# Patient Record
Sex: FEMALE | Race: BLACK OR AFRICAN AMERICAN | NOT HISPANIC OR LATINO | Employment: FULL TIME | URBAN - METROPOLITAN AREA
[De-identification: names, ages, dates, MRNs, and addresses within clinical notes are randomized per-mention and may not be internally consistent; named-entity substitution may affect disease eponyms.]

---

## 2023-04-15 LAB
CHLAMYDIA TRACH., AMPLIFIED: NEGATIVE
ERYTHROCYTE DISTRIBUTION WIDTH (RATIO) BY AUTOMATED COUNT: 12.4 % (ref 11.5–14.5)
ERYTHROCYTE MEAN CORPUSCULAR HEMOGLOBIN CONCENTRATION (G/DL) BY AUTOMATED: 32.1 G/DL (ref 32–36)
ERYTHROCYTE MEAN CORPUSCULAR VOLUME (FL) BY AUTOMATED COUNT: 91 FL (ref 80–100)
ERYTHROCYTES (10*6/UL) IN BLOOD BY AUTOMATED COUNT: 3.88 X10E12/L (ref 4–5.2)
GLUCOSE, 1 HR SCREEN, PREG: 104 MG/DL
HEMATOCRIT (%) IN BLOOD BY AUTOMATED COUNT: 35.2 % (ref 36–46)
HEMOGLOBIN (G/DL) IN BLOOD: 11.3 G/DL (ref 12–16)
HEPATITIS C VIRUS AB PRESENCE IN SERUM: NONREACTIVE
LEUKOCYTES (10*3/UL) IN BLOOD BY AUTOMATED COUNT: 6.7 X10E9/L (ref 4.4–11.3)
N. GONORRHEA, AMPLIFIED: NEGATIVE
NRBC (PER 100 WBCS) BY AUTOMATED COUNT: 0 /100 WBC (ref 0–0)
PLATELETS (10*3/UL) IN BLOOD AUTOMATED COUNT: 306 X10E9/L (ref 150–450)
REFLEX ADDED, ANEMIA PANEL: ABNORMAL
SYPHILIS TOTAL AB: NONREACTIVE
TRICHOMONAS VAGINALIS: NEGATIVE

## 2023-04-22 ENCOUNTER — HOSPITAL ENCOUNTER (OUTPATIENT)
Dept: DATA CONVERSION | Facility: HOSPITAL | Age: 23
End: 2023-04-22
Attending: OBSTETRICS & GYNECOLOGY

## 2023-04-22 DIAGNOSIS — O21.9 VOMITING OF PREGNANCY, UNSPECIFIED (HHS-HCC): ICD-10-CM

## 2023-04-22 DIAGNOSIS — R42 DIZZINESS AND GIDDINESS: ICD-10-CM

## 2023-04-22 DIAGNOSIS — O21.8 OTHER VOMITING COMPLICATING PREGNANCY (HHS-HCC): ICD-10-CM

## 2023-04-22 DIAGNOSIS — Z3A.31 31 WEEKS GESTATION OF PREGNANCY (HHS-HCC): ICD-10-CM

## 2023-04-22 DIAGNOSIS — O26.893 OTHER SPECIFIED PREGNANCY RELATED CONDITIONS, THIRD TRIMESTER (HHS-HCC): ICD-10-CM

## 2023-04-22 LAB
ALANINE AMINOTRANSFERASE (SGPT) (U/L) IN SER/PLAS: 12 U/L (ref 7–45)
ALBUMIN (G/DL) IN SER/PLAS: 3.8 G/DL (ref 3.4–5)
ALKALINE PHOSPHATASE (U/L) IN SER/PLAS: 136 U/L (ref 33–110)
ANION GAP IN SER/PLAS: 15 MMOL/L (ref 10–20)
ASPARTATE AMINOTRANSFERASE (SGOT) (U/L) IN SER/PLAS: 17 U/L (ref 9–39)
BILIRUBIN TOTAL (MG/DL) IN SER/PLAS: 0.4 MG/DL (ref 0–1.2)
CALCIUM (MG/DL) IN SER/PLAS: 9.6 MG/DL (ref 8.6–10.6)
CARBON DIOXIDE, TOTAL (MMOL/L) IN SER/PLAS: 20 MMOL/L (ref 21–32)
CHLORIDE (MMOL/L) IN SER/PLAS: 102 MMOL/L (ref 98–107)
CREATININE (MG/DL) IN SER/PLAS: 0.49 MG/DL (ref 0.5–1.05)
GFR FEMALE: >90 ML/MIN/1.73M2
GLUCOSE (MG/DL) IN SER/PLAS: 93 MG/DL (ref 74–99)
POTASSIUM (MMOL/L) IN SER/PLAS: 3.7 MMOL/L (ref 3.5–5.3)
PROTEIN TOTAL: 7.1 G/DL (ref 6.4–8.2)
SODIUM (MMOL/L) IN SER/PLAS: 133 MMOL/L (ref 136–145)
UREA NITROGEN (MG/DL) IN SER/PLAS: 7 MG/DL (ref 6–23)

## 2023-04-23 ENCOUNTER — HOSPITAL ENCOUNTER (OUTPATIENT)
Dept: DATA CONVERSION | Facility: HOSPITAL | Age: 23
End: 2023-04-23
Attending: OBSTETRICS & GYNECOLOGY

## 2023-04-23 DIAGNOSIS — Z3A.31 31 WEEKS GESTATION OF PREGNANCY (HHS-HCC): ICD-10-CM

## 2023-04-23 DIAGNOSIS — O21.9 VOMITING OF PREGNANCY, UNSPECIFIED (HHS-HCC): ICD-10-CM

## 2023-04-23 DIAGNOSIS — O99.513 DISEASES OF THE RESPIRATORY SYSTEM COMPLICATING PREGNANCY, THIRD TRIMESTER (HHS-HCC): ICD-10-CM

## 2023-04-23 DIAGNOSIS — R42 DIZZINESS AND GIDDINESS: ICD-10-CM

## 2023-04-23 DIAGNOSIS — J45.909 UNSPECIFIED ASTHMA, UNCOMPLICATED (HHS-HCC): ICD-10-CM

## 2023-04-23 DIAGNOSIS — O26.893 OTHER SPECIFIED PREGNANCY RELATED CONDITIONS, THIRD TRIMESTER (HHS-HCC): ICD-10-CM

## 2023-05-27 LAB — GROUP B STREP SCREEN: NORMAL

## 2023-05-29 ENCOUNTER — HOSPITAL ENCOUNTER (OUTPATIENT)
Dept: DATA CONVERSION | Facility: HOSPITAL | Age: 23
End: 2023-05-29
Attending: OBSTETRICS & GYNECOLOGY

## 2023-05-29 DIAGNOSIS — M79.89 OTHER SPECIFIED SOFT TISSUE DISORDERS: ICD-10-CM

## 2023-05-29 DIAGNOSIS — O09.13 SUPERVISION OF PREGNANCY WITH HISTORY OF ECTOPIC PREGNANCY, THIRD TRIMESTER (HHS-HCC): ICD-10-CM

## 2023-05-29 DIAGNOSIS — Z34.80 ENCOUNTER FOR SUPERVISION OF OTHER NORMAL PREGNANCY, UNSPECIFIED TRIMESTER (HHS-HCC): ICD-10-CM

## 2023-05-29 DIAGNOSIS — O99.213 OBESITY COMPLICATING PREGNANCY, THIRD TRIMESTER (HHS-HCC): ICD-10-CM

## 2023-05-29 DIAGNOSIS — J45.909 UNSPECIFIED ASTHMA, UNCOMPLICATED (HHS-HCC): ICD-10-CM

## 2023-05-29 DIAGNOSIS — O99.513 DISEASES OF THE RESPIRATORY SYSTEM COMPLICATING PREGNANCY, THIRD TRIMESTER (HHS-HCC): ICD-10-CM

## 2023-05-29 DIAGNOSIS — E66.9 OBESITY, UNSPECIFIED: ICD-10-CM

## 2023-05-29 DIAGNOSIS — O26.893 OTHER SPECIFIED PREGNANCY RELATED CONDITIONS, THIRD TRIMESTER (HHS-HCC): ICD-10-CM

## 2023-05-29 DIAGNOSIS — R03.0 ELEVATED BLOOD-PRESSURE READING, WITHOUT DIAGNOSIS OF HYPERTENSION: ICD-10-CM

## 2023-05-29 DIAGNOSIS — Z3A.37 37 WEEKS GESTATION OF PREGNANCY (HHS-HCC): ICD-10-CM

## 2023-05-29 DIAGNOSIS — O99.891 OTHER SPECIFIED DISEASES AND CONDITIONS COMPLICATING PREGNANCY (HHS-HCC): ICD-10-CM

## 2023-09-07 VITALS
SYSTOLIC BLOOD PRESSURE: 102 MMHG | HEIGHT: 59 IN | WEIGHT: 158.73 LBS | BODY MASS INDEX: 32 KG/M2 | OXYGEN SATURATION: 99 % | BODY MASS INDEX: 37.47 KG/M2 | DIASTOLIC BLOOD PRESSURE: 69 MMHG | SYSTOLIC BLOOD PRESSURE: 122 MMHG | RESPIRATION RATE: 17 BRPM | HEIGHT: 59 IN | WEIGHT: 185.85 LBS | RESPIRATION RATE: 20 BRPM | DIASTOLIC BLOOD PRESSURE: 75 MMHG | TEMPERATURE: 98.6 F | OXYGEN SATURATION: 96 % | HEART RATE: 91 BPM | HEART RATE: 84 BPM

## 2023-09-07 VITALS
HEART RATE: 96 BPM | SYSTOLIC BLOOD PRESSURE: 127 MMHG | RESPIRATION RATE: 16 BRPM | BODY MASS INDEX: 36.4 KG/M2 | WEIGHT: 180.56 LBS | OXYGEN SATURATION: 98 % | HEIGHT: 59 IN | TEMPERATURE: 98.1 F | DIASTOLIC BLOOD PRESSURE: 84 MMHG

## 2023-09-14 NOTE — PROGRESS NOTES
Current Stage:   Stage: Triage     Subjective Data:   Antepartum:  Vaginal Bleeding: No   Contractions/Abdominal Pain: No   Discharge/Loss of Fluid: No   Fetal Movement: Good   Fevers/Chills: No   Preeclampsia Symptoms: No   Antepartum:    22 y/o  @ 31.5 wga by LMP presents with complaints of nausea, vomiting & dizziness. Nausea & vomiting new. Dizziness & lightheadedness since 3rd trimester.  Most recent episodes since Monday were a combination of nausea, vomiting & dizziness on Monday. It resolved after a few hours on Monday, but recurred today. She has vomited a total of 2 times. Concerned about the dizziness because she had the symptoms  without even getting out of bed. Symptoms impacted her ability to function @ work, so she left early. She was awakened from her sleep with dizziness, so she decided to come for evaluation. Per visit note on 23, pt has c/o lightheadedness & dizziness  3-4 times a week. Has experienced a lot of work stress, and had given a 2 week notice. Pt denied fever, h/a, loss of smell, body ache, rhinorrhea or any other symptoms. Denied sick contacts. Endorses good FM, no ctxs, VB, LOF.     Prenatal notables:  Transfer of care from [facility] @ 29 weeks  Elevated BP reading @ OB FU 23 visit, rpt normal         Objective Information:    Objective Information:      T   P  R  BP   MAP  SpO2   Value  36.4  81  18  116/73   90  99%  Date/Time  2:40  2:40  2:40  2:40   2:40  2:40  Range  (36.4C - 36.4C )  (81 - 81 )  (18 - 18 )  (116 - 116 )/ (73 - 73 )  (90 - 90 )  (99% - 99% )      Pain reported at  2:40: 0 = None      Physical Exam:   Constitutional: Alert, oriented, NAD   Obstetric: , moderate variability, + accels  x1, no decels  TOCO irregular   VE Deferred, no labor complaints    Discussed with pt plan for Dr. Mendez to perform BPP while waiting for lab work. Overall EFM reassuring.   Eyes: Clear, no redness or drainage   ENMT: Normal appearing    Head/Neck: Thyroid appears not enlarged   Respiratory/Thorax: Normal respiratory effort   Cardiovascular: Warm & well perfused   Gastrointestinal: Gravid, soft nontender   Musculoskeletal: ROM intact   Neurological: No gross deficits   Psychological: Appropriate affect   Skin: No rashes or lesions     Assessment and Plan:   Assessment:    A:  24 y/o  @ 31.5 wga by LMP        Nausea & Vomiting in pregnancy, Dizziness       FHR Category II, overall reassuring w/moderate variability        P: Zofran as ordered       CMP stat as ordered       Dr. Mendez completed the BPP as planned       Plan to discharge home w/antiemetic if CMP WNL      F/U with OB Provider within a week, call for appt    CARMELO Kruse    2023 Addendum  Pt reviewed w/Dr. Pruitt  Pt was sleeping, easily aroused  Pt reports N/V & dizziness resolved  CMP results WNL  BPP  BENNY 19  Zofran sent to pharmacy as ordered  Pt encouraged to schedule appt for this coming week 23 - 23    CARMELO Kruse          Plan of Care Reviewed With:  Plan of Care Reviewed With: patient; family       Electronic Signatures:  Kimberly Richardson ([email or web address])  (Signed 2023 06:04)   Authored: Current Stage, Subjective Data, Objective Data,  Assessment and Plan, Note Completion      Last Updated: 2023 06:04 by Kimberly Richardson ([email or web address])

## 2023-09-14 NOTE — PROGRESS NOTES
Current Stage:   Stage: Triage     Subjective Data:   Antepartum:  Antepartum:    22yo  at 31.6 presents to triage reporting nausea, vomiting and dizziness x2 days. she had presented to L&D 36h ago. cmp & cbc were done and normal. pt was given  disintigrating by mouth med and d/c'd with rx for zofran tabs. since then she reports that she has not been able to keep any food or fluid down, although she feels very hungry. no sick contacts. no other symptoms of viral or bacterial infections.      Objective Information:    Objective Information:      T   P  R  BP   MAP  SpO2   Value  36.1  77  18  120/75   92  99%  Date/Time  13:55  14:35  13:55  13:55   13:55  14:35  Range  (36.1C - 37C )  (72 - 97 )  (17 - 18 )  (102 - 120 )/ (69 - 75 )  (92 - 92 )  (96% - 99% )  Highest temp of 37 C was recorded at  12:59      Physical Exam:   Obstetric: 140, mod parker, +accels, no decels  toco: irritable with occ contractions. pt not perceiving  ve df'd    pt unable to urinate yet     Assessment and Plan:   Assessment:    22yo  at 31.6  n/v x2 days of unknown etiology  reactive nst    will IV hydrate and give IV zofran. pt desires ice chips now. if able to tolerate will advance to saltines.   consider a second antiemetic agent if pt unable to tolerate by mouth.  ua when able    sb mcgarry      1687 addendum:  pt still has some nausea  will give reglan and another liter fluid then attempt by mouth challenge    sb mcgarry    1700 addendum  pt has slept well. feels better and is comfortable with d/c home  rx disintigrating zofran and reglan to pharmacy  discussed mild diet  sb mcgarry      Electronic Signatures:  Brandy Andrea (DEEPAK-SB)  (Signed 2023 17:08)   Authored: Current Stage, Subjective Data, Objective Data,  Assessment and Plan, Note Completion      Last Updated: 2023 17:08 by Brandy Andrea ([email or web address])

## 2025-02-09 ENCOUNTER — HOSPITAL ENCOUNTER (EMERGENCY)
Facility: HOSPITAL | Age: 25
Discharge: HOME | End: 2025-02-09
Attending: EMERGENCY MEDICINE
Payer: COMMERCIAL

## 2025-02-09 VITALS
RESPIRATION RATE: 16 BRPM | DIASTOLIC BLOOD PRESSURE: 77 MMHG | OXYGEN SATURATION: 99 % | WEIGHT: 130 LBS | HEART RATE: 104 BPM | BODY MASS INDEX: 26.21 KG/M2 | SYSTOLIC BLOOD PRESSURE: 130 MMHG | HEIGHT: 59 IN | TEMPERATURE: 97.2 F

## 2025-02-09 DIAGNOSIS — S01.511A LIP LACERATION, INITIAL ENCOUNTER: Primary | ICD-10-CM

## 2025-02-09 PROCEDURE — 99283 EMERGENCY DEPT VISIT LOW MDM: CPT | Mod: 25 | Performed by: EMERGENCY MEDICINE

## 2025-02-09 PROCEDURE — 2500000004 HC RX 250 GENERAL PHARMACY W/ HCPCS (ALT 636 FOR OP/ED)

## 2025-02-09 PROCEDURE — 99284 EMERGENCY DEPT VISIT MOD MDM: CPT | Performed by: EMERGENCY MEDICINE

## 2025-02-09 PROCEDURE — 12052 INTMD RPR FACE/MM 2.6-5.0 CM: CPT

## 2025-02-09 PROCEDURE — 2500000005 HC RX 250 GENERAL PHARMACY W/O HCPCS

## 2025-02-09 PROCEDURE — 12013 RPR F/E/E/N/L/M 2.6-5.0 CM: CPT | Performed by: EMERGENCY MEDICINE

## 2025-02-09 PROCEDURE — 2500000001 HC RX 250 WO HCPCS SELF ADMINISTERED DRUGS (ALT 637 FOR MEDICARE OP)

## 2025-02-09 RX ORDER — LIDOCAINE HYDROCHLORIDE 20 MG/ML
1.25 SOLUTION OROPHARYNGEAL ONCE
Status: COMPLETED | OUTPATIENT
Start: 2025-02-09 | End: 2025-02-09

## 2025-02-09 RX ORDER — AMOXICILLIN AND CLAVULANATE POTASSIUM 875; 125 MG/1; MG/1
1 TABLET, FILM COATED ORAL EVERY 12 HOURS
Qty: 14 TABLET | Refills: 0 | Status: SHIPPED | OUTPATIENT
Start: 2025-02-09 | End: 2025-02-16

## 2025-02-09 RX ORDER — LIDOCAINE HYDROCHLORIDE 10 MG/ML
10 INJECTION, SOLUTION INFILTRATION; PERINEURAL ONCE
Status: COMPLETED | OUTPATIENT
Start: 2025-02-09 | End: 2025-02-09

## 2025-02-09 RX ORDER — AMOXICILLIN AND CLAVULANATE POTASSIUM 875; 125 MG/1; MG/1
1 TABLET, FILM COATED ORAL ONCE
Status: COMPLETED | OUTPATIENT
Start: 2025-02-09 | End: 2025-02-09

## 2025-02-09 RX ADMIN — LIDOCAINE HYDROCHLORIDE 1.25 ML: 20 SOLUTION ORAL; TOPICAL at 21:26

## 2025-02-09 RX ADMIN — LIDOCAINE HYDROCHLORIDE 10 ML: 10 INJECTION, SOLUTION INFILTRATION; PERINEURAL at 21:26

## 2025-02-09 RX ADMIN — AMOXICILLIN AND CLAVULANATE POTASSIUM 1 TABLET: 875; 125 TABLET, FILM COATED ORAL at 22:48

## 2025-02-09 ASSESSMENT — COLUMBIA-SUICIDE SEVERITY RATING SCALE - C-SSRS
6. HAVE YOU EVER DONE ANYTHING, STARTED TO DO ANYTHING, OR PREPARED TO DO ANYTHING TO END YOUR LIFE?: NO
1. IN THE PAST MONTH, HAVE YOU WISHED YOU WERE DEAD OR WISHED YOU COULD GO TO SLEEP AND NOT WAKE UP?: NO
2. HAVE YOU ACTUALLY HAD ANY THOUGHTS OF KILLING YOURSELF?: NO

## 2025-02-09 ASSESSMENT — PAIN - FUNCTIONAL ASSESSMENT: PAIN_FUNCTIONAL_ASSESSMENT: 0-10

## 2025-02-09 ASSESSMENT — LIFESTYLE VARIABLES
TOTAL SCORE: 0
HAVE YOU EVER FELT YOU SHOULD CUT DOWN ON YOUR DRINKING: NO
HAVE PEOPLE ANNOYED YOU BY CRITICIZING YOUR DRINKING: NO
EVER HAD A DRINK FIRST THING IN THE MORNING TO STEADY YOUR NERVES TO GET RID OF A HANGOVER: NO
EVER FELT BAD OR GUILTY ABOUT YOUR DRINKING: NO

## 2025-02-09 ASSESSMENT — PAIN SCALES - GENERAL: PAINLEVEL_OUTOF10: 6
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
## 2025-02-10 NOTE — DISCHARGE INSTRUCTIONS
You were seen today in the ED for a lip laceration. This laceration was repaired with sutures. These sutures are absorbable and will dissolve on there own. Please take the antibiotic as prescribed. Return to the ED if you have increased pain, swelling of your lip, fever, or chills. Otherwise follow up with your primary care provider.

## 2025-02-10 NOTE — ED PROCEDURE NOTE
Procedure  Nerve Block    Performed by: Satish Leslie DO  Authorized by: Tobias Lemos MD    Consent:     Consent obtained:  Verbal    Consent given by:  Patient    Risks discussed:  Allergic reaction, bleeding, infection, intravenous injection, nerve damage, pain, swelling and unsuccessful block  Universal protocol:     Patient identity confirmed:  Verbally with patient and arm band  Indications:     Indications:  Procedural anesthesia  Location:     Body area:  Head    Head nerve blocked: infraorbital.    Laterality:  Right  Skin anesthesia:     Skin anesthesia method:  Topical application    Topical anesthetic:  Lidocaine gel  Procedure details:     Block needle gauge:  27 G    Anesthetic injected:  Lidocaine 1% w/o epi    Injection procedure:  Anatomic landmarks identified, anatomic landmarks palpated, introduced needle, incremental injection and negative aspiration for blood  Post-procedure details:     Dressing:  None    Outcome:  Anesthesia achieved    Procedure completion:  Tolerated well, no immediate complications               Satish Leslie DO  Resident  02/09/25 [id number]

## 2025-02-10 NOTE — ED PROCEDURE NOTE
Procedure  Laceration Repair    Performed by: Satish Leslie DO  Authorized by: Tobias Lemos MD    Consent:     Consent obtained:  Verbal    Consent given by:  Patient    Risks discussed:  Infection, need for additional repair, nerve damage, poor wound healing, poor cosmetic result, retained foreign body and pain  Universal protocol:     Patient identity confirmed:  Verbally with patient and arm band  Anesthesia:     Anesthesia method:  Local infiltration (Nerve block please see seperate procedure note for detaiils)    Local anesthetic:  Lidocaine 1% w/o epi  Laceration details:     Location:  Lip    Lip location:  Upper exterior lip and upper interior lip    Length (cm):  3  Pre-procedure details:     Preparation:  Patient was prepped and draped in usual sterile fashion  Exploration:     Hemostasis achieved with:  Direct pressure    Imaging outcome: foreign body not noted      Wound exploration: wound explored through full range of motion and entire depth of wound visualized      Contaminated: yes    Treatment:     Area cleansed with:  Saline    Amount of cleaning:  Standard    Irrigation solution:  Sterile saline    Irrigation volume:  500cc    Irrigation method:  Syringe and pressure wash    Visualized foreign bodies/material removed: no      Debridement:  None    Undermining:  None    Scar revision: no    Skin repair:     Repair method:  Sutures    Suture size:  5-0    Suture material:  Chromic gut    Suture technique:  Simple interrupted    Number of sutures:  5  Approximation:     Approximation:  Close    Vermilion border well-aligned: yes    Repair type:     Repair type:  Intermediate  Post-procedure details:     Dressing:  Open (no dressing)    Procedure completion:  Tolerated well, no immediate complications               Satish Leslie DO  Resident  02/09/25 [id number]

## 2025-04-01 ENCOUNTER — APPOINTMENT (OUTPATIENT)
Dept: OBSTETRICS AND GYNECOLOGY | Facility: HOSPITAL | Age: 25
End: 2025-04-01
Payer: COMMERCIAL